# Patient Record
(demographics unavailable — no encounter records)

---

## 2024-10-09 NOTE — ASSESSMENT
Elaine Shoemaker Patient Age: 54 year old  MESSAGE:   Caller:  Patient    Reason for Call: Patient called and states that the [facility] told her that the Lithium 25mg was not made there. Patient wants to know if we can send script to [facility]. Patient is also requesting a 90 day supply.     Patient/Parent Informed that Provider is:  In the Office Today       WEIGHT AND HEIGHT:   Wt Readings from Last 1 Encounters:   05/05/22 130.2 kg (287 lb)     Ht Readings from Last 1 Encounters:   05/05/22 5' 7\" (1.702 m)     BMI Readings from Last 1 Encounters:   05/05/22 44.95 kg/m²       ALLERGIES:  Carbamazepine, Sulfa antibiotics, Amitriptyline, Bupropion, Hydrocodone-acetaminophen, and Ibuprofen  Current Outpatient Medications   Medication   • QUEtiapine (SEROquel) 400 MG tablet   • QUEtiapine (SEROquel) 100 MG tablet   • benztropine (COGENTIN) 0.5 MG tablet   • topiramate (TOPAMAX) 50 MG tablet   • Lithium Carbonate Powder   • metFORMIN (GLUCOPHAGE-XR) 500 MG 24 hr tablet   • chlorproMAZINE (THORAZINE) 25 MG tablet   • LORazepam (Ativan) 0.5 MG tablet   • propRANolol (INDERAL) 20 MG tablet   • atorvastatin (LIPITOR) 40 MG tablet   • lithium 300 MG capsule   • Easy Comfort Lancets Misc   • Sodium Sulfate-Mag Sulfate-KCl 9058-017-828 MG Tab   • Vitamin D, Ergocalciferol, 1.25 mg (50,000 units) capsule   • Accu-Chek Cheryl Plus test strip   • levothyroxine 125 MCG tablet   • polyethylene glycol (MIRALAX) 17 g packet   • pioglitazone (ACTOS) 15 MG tablet   • B Complex Vitamins (B COMPLEX 1 PO)   • vitamin A 45397 units capsule   • aspirin (SHANNAN ASPIRIN EC LOW DOSE) 81 MG EC tablet   • vitamin E 400 UNIT capsule     No current facility-administered medications for this visit.     PHARMACY to use: [facility]          Pharmacy preference(s) on file:   [facility] [place] [address] [id number]  Phone: [phone] Fax: [phone]    [id number] - [place], [place] -  [FreeTextEntry1] : Plan:  #Cough X-Ray Tessalon Pearls  #CABG Healing Well Sutures removed  #RLE Burney Aite Keflex Furosemide 40mg po X 2  [address] [facility] ([address])  [address] [facility] [address]  Phone: [phone] Fax: [phone]      CALL BACK INFO: Ok to leave response (including medical information) on answering machine  ROUTING: Patient's physician/staff        PCP: Meryl Wilson MD         INS: Payor: BLUE CROSS BLUE [place]ELD [place] / Plan: BLUE CHOICE PREFERRED / Product Type: PPO MISC   PATIENT ADDRESS:  [address] [place] [id number]

## 2024-10-09 NOTE — COUNSELING
[Hygeine (Including Daily Shower)] : hygeine (including daily shower) [Importance of Regular Medical Follow-Up] : the importance of regular medical follow-up [No Heavy Lifting] : no heavy lifting (>15-20 lb. for 1 month or 25 lb. for 3 months from date of surgery) [Blood Pressure Control] : blood pressure control [S/S of infection] : signs and symptoms of infection (and to whom it should be reported) [Progressive Ambulation/Activity] : progressive ambulation/activity [Medication/Vitamin/Herb/Food Interaction] : medication/vitamin/herb/food interaction [FreeTextEntry1] : BRAYAN HOUSE is a 60 year M status post CABG. This is a follow up visit with the patient. During the post op visit all cardiac and sternal precautions were reviewed with the patient. Incision care was reviewed. Education regarding nutrition was also reviewed with the patient, to maintain a low salt diet. On arrival patient denies fever, chills nausea, and vomiting. Denies SOB or palpitation. All questions and concerns were addressed. Patient educated on diet and salt restrictions. Medications were reviewed with the patient, and education for cardiac surgery patient must continue aspirin, and statin post-operatively until re-evaluated by patients cardiologist. Patient was instructed to follow up with their cardiologist as appropriate for long term management.

## 2024-10-09 NOTE — REASON FOR VISIT
[Family Member] : family member [de-identified] : s/p CABG, TRACIE Clip [de-identified] : Mr. Hudson is a 59 y/o male that arrives today for a post op visit. Offers no complaints.

## 2024-10-16 NOTE — REASON FOR VISIT
[Family Member] : family member [de-identified] : s/p CABG, TRACIE Clip [de-identified] : Mr. Hudson is a 61 y/o male that arrives today for a post op visit. Offers no complaints.

## 2024-10-16 NOTE — ASSESSMENT
[FreeTextEntry1] : Plan:  #Cough X-Ray Tessalon Pearls continue  #CABG Healing Well Sutures removed RTO 1 weeks for Bp check  #RLE [place] Aite Keflex Continue Furosemide 20mg po qd X 5 days  #HTN Losartan 50 mg

## 2024-10-23 NOTE — REASON FOR VISIT
[Family Member] : family member [de-identified] : s/p CABG, TRACIE Clip [de-identified] : Mr. Hudson is a 61 y/o male that arrives today for a post op visit. Offers no complaints.

## 2024-10-23 NOTE — PHYSICAL EXAM
[] : no respiratory distress [Auscultation Breath Sounds / Voice Sounds] : lungs were clear to auscultation bilaterally [Heart Rate And Rhythm] : heart rate was normal and rhythm regular [Heart Sounds] : normal S1 and S2 [Heart Sounds Gallop] : no gallops [Murmurs] : no murmurs [Heart Sounds Pericardial Friction Rub] : no pericardial rub [Clean] : clean [Dry] : dry [Healing Well] : healing well [No Edema] : no edema Solaraze Counseling:  I discussed with the patient the risks of Solaraze including but not limited to erythema, scaling, itching, weeping, crusting, and pain.

## 2024-10-23 NOTE — ASSESSMENT
[FreeTextEntry1] : Plan:  #Cough X-Ray Tessalon Pearls continue  #CABG Healing Well Sutures removed RTO 2 weeks for Bp check  #HTN Losartan 50 mg BP much better controlled F/U with CTS 2 weeks

## 2024-11-04 NOTE — ASSESSMENT
[FreeTextEntry1] : Mr. BRAYAN HOUSE is a 60 year old man with PMHx of chronic HFmrEF (41%, ICM), multivessel CAD s/p CABG (LIMA-LAD, SVG-OM and RPDA 9/24/24), PFO closure and TRACIE clip, HTN, type 1 IDDM, HLD, and CVA who is presenting for follow up.   -Reviewed TTE (independently), labs -BP elevated, likely due to being off previous anti-hypertensive agents -Stop losartan 50 daily and resume previous entresto  BID -Stop metoprolol tartrate and start metoprolol succinate 50 daily -Will have patient RTC in 2 weeks for BP check. Could switch metoprolol to carvedilol or start spironolactone. -Refer for cardiac rehab due to recent CABG and ICM -Defer [id number] given history of type 1 DM and DKA -Renal artery US negative for renal artery stenosis -Continue atorvastatin 80 daily for HLD for LDL goal <70 -Continue plavix 75 given CVA and CAD. Reduce  to ASA 81 daily. Likely to drop 1 at next visit -Following with EP for ILR interrogation  Time in encounter, including face to face visit and time reviewing chart:  42minutes  Roni Escalona MD, FACC Non-Invasive Cardiology [address], [address] Office: [phone]

## 2024-11-04 NOTE — REASON FOR VISIT
[FreeTextEntry1] : Mr. BRAYAN HOUSE is a 60 year old man with PMHx of chronic HFmrEF (41%, ICM), multivessel CAD s/p CABG (LIMA-LAD, SVG-OM and RPDA 9/24/24), PFO closure and TRACIE clip, HTN, type 1 IDDM, HLD, and CVA who is presenting for follow up. Since last visit, he underwent CABG, PFO closure and TRACIE clip. He has been recovering at home, and generally feels well. He is hoping to get back into exercising. He has no chest pain or dyspnea. He has mild fatigue, but is walking regularly.    TTE 9/27/24 Summary:  1. Left ventricular ejection fraction, by visual estimation, is 40 to 45%.  2. Mildly to moderately decreased global left ventricular systolic function.  3. Apical septal segment, apical inferior segment, apex, and apical anterior segment are abnormal as described above.  4. Spectral Doppler shows impaired relaxation pattern of left ventricular myocardial filling (Grade I diastolic dysfunction).  5. Normal left atrial size.  6. Normal right atrial size.  7. Trace mitral valve regurgitation.  8. Mild tricuspid regurgitation.  9. Sclerotic aortic valve with normal opening. 10. Compared to the TTE on 4/23/24, no significant change noted.  TTE 6/18/24 CONCLUSIONS: 1. Left ventricular cavity is normal in size. Left ventricular wall thickness is normal. Left ventricular systolic function is mildly decreased with an ejection fraction of 41 % by Aragon's method of disks. 2. Entire apex, mid and apical anterior wall, and mid and apical anterior septum are hypokinetic. 3. There is no evidence of a left ventricular thrombus. 4. Normal right ventricular cavity size, with normal wall thickness, and normal systolic function. 5. Compared to the transthoracic echocardiogram performed on 4/24/2024, the LV function appears similar.  TTE 4/24/24 Summary:  1. Technically difficult study. Endocardial visualization was enhanced with intravenous echo contrast.  2. Mildly decreased global left ventricular systolic function.  3. LV Ejection Fraction by Aragon's Method with a biplane EF of 40 %.  4. Normal left ventricular internal cavity size.  5. Entire apex and mid inferoseptal segment are abnormal as described above.  6. Cannot exclude left ventricular apical thrombus.  7. Normal right ventricular size and function.  8. No significant valve disease.  9. This study was ordered as a bubble study. Multiple attempts were made with saline injection however bubbles did not reach the heart. Clinical correlation advised.  VIKTORIA 4/25/24 Summary:  1. Left ventricular ejection fraction, by visual estimation, is 40 to 45%.  2. Mildly decreased global left ventricular systolic function.  3. Normal left atrial size.  4. Normal right atrial size.  5. Trace mitral valve regurgitation.  6. Intravenous injection of agitated saline demonstrates the presence of a small patent foramen ovale.  7. No left atrial appendage thrombus and normal left atrial appendage velocities.  [facility] 8/27/24 Coronary Dominance: RCA LM: No CAD LAD: mLAD , dLAD supplied by left to left collaterals with FRAN 1 flow D1: Mild disease CX: Small vessel with mild disease OM1: 80% stenosis RCA: pRCA 50% stenosis, dRCA 20% disease RPDA: No CAD LVEDP: 6 mmHg EF: 41 % (TTE) ESTIMATED BLOOD LOSS: < 10 mL

## 2024-11-04 NOTE — REASON FOR VISIT
[Family Member] : family member [de-identified] : s/p CABG, TRACIE Clip [de-identified] : Mr. Hudson is a 61 y/o male that arrives today for a post op visit. Offers no complaints.

## 2024-11-04 NOTE — ASSESSMENT
[FreeTextEntry1] : Plan:  #CABG Healing Well Sutures removed   #HTN Losartan 50 mg changed to Entresto per Cardio BP much better controlled  Doing well overall  Due to Off pump CABG DAPT full dose for 6 months F/U with Dr. Escalona for continued management F/U with CTS prn

## 2024-11-07 NOTE — REASON FOR VISIT
[de-identified] : s/p CABG, TRACIE Clip [de-identified] : Mr. Hudson is a 59 y/o male that arrives today for a post op visit. Offers no complaints. [Family Member] : family member

## 2024-11-07 NOTE — PHYSICAL EXAM
[] : no respiratory distress [Auscultation Breath Sounds / Voice Sounds] : lungs were clear to auscultation bilaterally [Heart Rate And Rhythm] : heart rate was normal and rhythm regular [Heart Sounds] : normal S1 and S2 [Heart Sounds Gallop] : no gallops [Murmurs] : no murmurs [Clean] : clean [Heart Sounds Pericardial Friction Rub] : no pericardial rub [Dry] : dry [Healing Well] : healing well [No Edema] : no edema

## 2024-11-07 NOTE — ASSESSMENT
[FreeTextEntry1] : Plan: Patient came in for redness in his eye.  Examined Blown capillary note, No swelling No pain No vision changed Explained if any change to see ophthalmology immediately Occurred after lifting heavy water bottles Reviewed precautions   #CABG Healing Well Sutures removed   #HTN Losartan 50 mg changed to Entresto per Cardio BP much better controlled  Doing well overall  Due to Off pump CABG DAPT full dose for 6 months F/U with Dr. Escalona for continued management F/U with CTS prn

## 2024-11-13 NOTE — CARDIOLOGY SUMMARY
[de-identified] : 5/13/2024:  [de-identified] :  1. Technically difficult study. Endocardial visualization was enhanced  with intravenous echo contrast.  2. Mildly decreased global left ventricular systolic function.  3. LV Ejection Fraction by Aragon's Method with a biplane EF of 40 %.  4. Normal left ventricular internal cavity size.  5. Entire apex and mid inferoseptal segment are abnormal as described  above.  6. Cannot exclude left ventricular apical thrombus.  7. Normal right ventricular size and function.  8. No significant valve disease.  9. This study was ordered as a bubble study. Multiple attempts were made  with saline injection however bubbles did not reach the heart. Clinical  correlation advised.   1. Left ventricular ejection fraction, by visual estimation, is 40 to  45%.  2. Mildly decreased global left ventricular systolic function.  3. Normal left atrial size.  4. Normal right atrial size.  5. Trace mitral valve regurgitation.  6. Intravenous injection of agitated saline demonstrates the presence of  a small patent foramen ovale.  7. No left atrial appendage thrombus and normal left atrial appendage  velocities.

## 2024-11-13 NOTE — HISTORY OF PRESENT ILLNESS
[de-identified] : 61 yo male with hx HTN, DM, HLD who presented with an acute right MCA CVA. He underwent ILR placement for occult AF monitoring.  He presents for follow up. He underwent CABG, TRACIE clip and PFO closure 9/2024.   The patient is feeling well and has no cardiac complaints. Denies CP, palpitations, SOB, dizziness, CONTI, PND, syncope.

## 2024-11-13 NOTE — PROCEDURE
[de-identified] : [employer]onic [de-identified] : LINQ II [de-identified] : [id number] [de-identified] : 4/25/24 [de-identified] : Good [de-identified] : 5 false pause episodes 2 pause episodes on 9/24/2024 coinciding with date of CABG

## 2024-11-13 NOTE — CARDIOLOGY SUMMARY
[de-identified] : 5/13/2024:  [de-identified] :  1. Technically difficult study. Endocardial visualization was enhanced  with intravenous echo contrast.  2. Mildly decreased global left ventricular systolic function.  3. LV Ejection Fraction by Aragon's Method with a biplane EF of 40 %.  4. Normal left ventricular internal cavity size.  5. Entire apex and mid inferoseptal segment are abnormal as described  above.  6. Cannot exclude left ventricular apical thrombus.  7. Normal right ventricular size and function.  8. No significant valve disease.  9. This study was ordered as a bubble study. Multiple attempts were made  with saline injection however bubbles did not reach the heart. Clinical  correlation advised.   1. Left ventricular ejection fraction, by visual estimation, is 40 to  45%.  2. Mildly decreased global left ventricular systolic function.  3. Normal left atrial size.  4. Normal right atrial size.  5. Trace mitral valve regurgitation.  6. Intravenous injection of agitated saline demonstrates the presence of  a small patent foramen ovale.  7. No left atrial appendage thrombus and normal left atrial appendage  velocities.

## 2024-11-13 NOTE — PROCEDURE
[de-identified] : [employer]onic [de-identified] : LINQ II [de-identified] : [id number] [de-identified] : 4/25/24 [de-identified] : Good [de-identified] : 5 false pause episodes 2 pause episodes on 9/24/2024 coinciding with date of CABG

## 2024-11-13 NOTE — HISTORY OF PRESENT ILLNESS
[de-identified] : 59 yo male with hx HTN, DM, HLD who presented with an acute right MCA CVA. He underwent ILR placement for occult AF monitoring.  He presents for follow up. He underwent CABG, TRACIE clip and PFO closure 9/2024.   The patient is feeling well and has no cardiac complaints. Denies CP, palpitations, SOB, dizziness, CONTI, PND, syncope.

## 2024-11-13 NOTE — PHYSICAL EXAM
[General Appearance - Well Developed] : well developed [Normal Appearance] : normal appearance [Well Groomed] : well groomed [General Appearance - Well Nourished] : well nourished [No Deformities] : no deformities [General Appearance - In No Acute Distress] : no acute distress [Heart Rate And Rhythm] : heart rate and rhythm were normal [Heart Sounds] : normal S1 and S2 [Murmurs] : no murmurs present [Arterial Pulses Normal] : the arterial pulses were normal [] : no respiratory distress [Well-Healed] : well-healed [Abdomen Soft] : soft [Nail Clubbing] : no clubbing of the fingernails [Cyanosis, Localized] : no localized cyanosis [FreeTextEntry1] : Parasternal

## 2024-11-13 NOTE — CARDIOLOGY SUMMARY
[de-identified] : 5/13/2024:  [de-identified] :  1. Technically difficult study. Endocardial visualization was enhanced  with intravenous echo contrast.  2. Mildly decreased global left ventricular systolic function.  3. LV Ejection Fraction by Aragon's Method with a biplane EF of 40 %.  4. Normal left ventricular internal cavity size.  5. Entire apex and mid inferoseptal segment are abnormal as described  above.  6. Cannot exclude left ventricular apical thrombus.  7. Normal right ventricular size and function.  8. No significant valve disease.  9. This study was ordered as a bubble study. Multiple attempts were made  with saline injection however bubbles did not reach the heart. Clinical  correlation advised.   1. Left ventricular ejection fraction, by visual estimation, is 40 to  45%.  2. Mildly decreased global left ventricular systolic function.  3. Normal left atrial size.  4. Normal right atrial size.  5. Trace mitral valve regurgitation.  6. Intravenous injection of agitated saline demonstrates the presence of  a small patent foramen ovale.  7. No left atrial appendage thrombus and normal left atrial appendage  velocities.

## 2024-11-13 NOTE — HISTORY OF PRESENT ILLNESS
[de-identified] : 61 yo male with hx HTN, DM, HLD who presented with an acute right MCA CVA. He underwent ILR placement for occult AF monitoring.  He presents for follow up. He underwent CABG, TRACIE clip and PFO closure 9/2024.   The patient is feeling well and has no cardiac complaints. Denies CP, palpitations, SOB, dizziness, CONTI, PND, syncope.

## 2024-11-13 NOTE — ASSESSMENT
[FreeTextEntry1] : CVA s/p ILR  - Wound is well healed. There are no signs or symptoms of infection of inflammation. There is no redness, no swelling, no erythema. The patient has no pain.  - Device interrogation normal. I interrogated and reprogrammed the device as described above.  - No significant events.  - The patient is on remote and transmitting.   CVA  - Follow up neuro as scheduled. No deficits.   Cardiomyopathy, PFO s/p CABG, TRACIE clip and PFO closure on 9/24/2024  - Doing well.  - Increase physical activity as tolerated. - 2g Na HH diet.  HTN - BP elevated, manual recheck improved but still elevated.  - BP log at home. - F/U with Dr. Escalona.   RTO in 9-12 months.

## 2024-11-13 NOTE — PROCEDURE
[de-identified] : [employer] [de-identified] : LINQ II [de-identified] : [id number] [de-identified] : 4/25/24 [de-identified] : Good [de-identified] : 5 false pause episodes 2 pause episodes on 9/24/2024 coinciding with date of CABG

## 2024-12-04 NOTE — ASSESSMENT
[FreeTextEntry1] : Mr. BRAYAN HOUSE is a 60 year old man with PMHx of chronic HFmrEF (41%, ICM), multivessel CAD s/p CABG (LIMA-LAD, SVG-OM and RPDA 9/24/24), PFO closure and TRACIE clip, HTN, type 1 IDDM, HLD, and CVA who is presenting for follow up.   -Reviewed TTE (independently), labs -BP elevated, likely due to being off previous anti-hypertensive agents -Continue entresto  BID -Continue coreg 25 BID -Add spironolactone due to elevated BP and LE swelling  -Refer for cardiac rehab due to recent CABG and ICM -Defer [id number] given history of type 1 DM and DKA -Renal artery US negative for renal artery stenosis -Continue atorvastatin 80 daily for HLD for LDL goal <70 -Continue plavix 75 given CVA and CAD. Reduce  to ASA 81 daily. Likely to drop 1 at next visit -Following with EP for ILR interrogation  Time in encounter, including face to face visit and time reviewing chart:  42minutes  Roni Escalona MD, FACC Non-Invasive Cardiology [address] Office: [phone]

## 2024-12-04 NOTE — REASON FOR VISIT
[FreeTextEntry1] : Mr. BRAYAN HOUSE is a 60 year old man with PMHx of chronic HFmrEF (41%, ICM), multivessel CAD s/p CABG (LIMA-LAD, SVG-OM and RPDA 9/24/24), PFO closure and TRACIE clip, HTN, type 1 IDDM, HLD, and CVA who is presenting for follow up. Since last visit, he continues to recover at home, and generally feels well. He is hoping to get back into exercising. He has no chest pain or dyspnea. He has mild fatigue, but is walking regularly.    TTE 9/27/24 Summary:  1. Left ventricular ejection fraction, by visual estimation, is 40 to 45%.  2. Mildly to moderately decreased global left ventricular systolic function.  3. Apical septal segment, apical inferior segment, apex, and apical anterior segment are abnormal as described above.  4. Spectral Doppler shows impaired relaxation pattern of left ventricular myocardial filling (Grade I diastolic dysfunction).  5. Normal left atrial size.  6. Normal right atrial size.  7. Trace mitral valve regurgitation.  8. Mild tricuspid regurgitation.  9. Sclerotic aortic valve with normal opening. 10. Compared to the TTE on 4/23/24, no significant change noted.  TTE 6/18/24 CONCLUSIONS: 1. Left ventricular cavity is normal in size. Left ventricular wall thickness is normal. Left ventricular systolic function is mildly decreased with an ejection fraction of 41 % by Aragon's method of disks. 2. Entire apex, mid and apical anterior wall, and mid and apical anterior septum are hypokinetic. 3. There is no evidence of a left ventricular thrombus. 4. Normal right ventricular cavity size, with normal wall thickness, and normal systolic function. 5. Compared to the transthoracic echocardiogram performed on 4/24/2024, the LV function appears similar.  TTE 4/24/24 Summary:  1. Technically difficult study. Endocardial visualization was enhanced with intravenous echo contrast.  2. Mildly decreased global left ventricular systolic function.  3. LV Ejection Fraction by Aragon's Method with a biplane EF of 40 %.  4. Normal left ventricular internal cavity size.  5. Entire apex and mid inferoseptal segment are abnormal as described above.  6. Cannot exclude left ventricular apical thrombus.  7. Normal right ventricular size and function.  8. No significant valve disease.  9. This study was ordered as a bubble study. Multiple attempts were made with saline injection however bubbles did not reach the heart. Clinical correlation advised.  VIKTORIA 4/25/24 Summary:  1. Left ventricular ejection fraction, by visual estimation, is 40 to 45%.  2. Mildly decreased global left ventricular systolic function.  3. Normal left atrial size.  4. Normal right atrial size.  5. Trace mitral valve regurgitation.  6. Intravenous injection of agitated saline demonstrates the presence of a small patent foramen ovale.  7. No left atrial appendage thrombus and normal left atrial appendage velocities.  [facility] 8/27/24 Coronary Dominance: RCA LM: No CAD LAD: mLAD , dLAD supplied by left to left collaterals with FRAN 1 flow D1: Mild disease CX: Small vessel with mild disease OM1: 80% stenosis RCA: pRCA 50% stenosis, dRCA 20% disease RPDA: No CAD LVEDP: 6 mmHg EF: 41 % (TTE) ESTIMATED BLOOD LOSS: < 10 mL

## 2024-12-04 NOTE — ASSESSMENT
[FreeTextEntry1] : Mr. BRAYAN HOUSE is a 60 year old man with PMHx of chronic HFmrEF (41%, ICM), multivessel CAD s/p CABG (LIMA-LAD, SVG-OM and RPDA 9/24/24), PFO closure and TRACIE clip, HTN, type 1 IDDM, HLD, and CVA who is presenting for follow up.   -Reviewed TTE (independently), labs -BP elevated, likely due to being off previous anti-hypertensive agents -Continue entresto  BID -Continue coreg 25 BID -Add spironolactone due to elevated BP and LE swelling  -Refer for cardiac rehab due to recent CABG and ICM -Defer [id number] given history of type 1 DM and DKA -Renal artery US negative for renal artery stenosis -Continue atorvastatin 80 daily for HLD for LDL goal <70 -Continue plavix 75 given CVA and CAD. Reduce  to ASA 81 daily. Likely to drop 1 at next visit -Following with EP for ILR interrogation  Time in encounter, including face to face visit and time reviewing chart:  42minutes  Roni Escalona MD, FACC Non-Invasive Cardiology [address] Office: [phone]

## 2025-02-21 NOTE — REASON FOR VISIT
[Coronary Artery Disease] : coronary artery disease [FreeTextEntry1] : Mr. BRAYAN HOUSE is a 61 year old man with PMHx of chronic HFmrEF (41%, ICM), multivessel CAD s/p CABG (LIMA-LAD, SVG-OM and RPDA 9/24/24), PFO closure and TRACIE clip, HTN, type 1 IDDM, HLD, and CVA who is presenting for follow up. He is undergoing cardiac rehab, and enjoying it very much. His exercise ability and endurance are improving. He has no chest pain or dyspnea.   In April 2024, he presented with stroke symptoms and was found to have acute ischemic stroke of the R MCA. Neurology felt the stroke was embolic. He underwent TTE, which demonstrated a moderately reduce LV function 40%. VIKTORIA was significant for a small PFO. ILR implanted. He underwent LHC, which revealed multivessel CAD. He then underwent CABG, PFO closure, and TRACIE clip with Dr Lowry 9/2024.  TTE 9/27/24 Summary:  1. Left ventricular ejection fraction, by visual estimation, is 40 to 45%.  2. Mildly to moderately decreased global left ventricular systolic function.  3. Apical septal segment, apical inferior segment, apex, and apical anterior segment are abnormal as described above.  4. Spectral Doppler shows impaired relaxation pattern of left ventricular myocardial filling (Grade I diastolic dysfunction).  5. Normal left atrial size.  6. Normal right atrial size.  7. Trace mitral valve regurgitation.  8. Mild tricuspid regurgitation.  9. Sclerotic aortic valve with normal opening. 10. Compared to the TTE on 4/23/24, no significant change noted.  TTE 6/18/24 CONCLUSIONS: 1. Left ventricular cavity is normal in size. Left ventricular wall thickness is normal. Left ventricular systolic function is mildly decreased with an ejection fraction of 41 % by Aragon's method of disks. 2. Entire apex, mid and apical anterior wall, and mid and apical anterior septum are hypokinetic. 3. There is no evidence of a left ventricular thrombus. 4. Normal right ventricular cavity size, with normal wall thickness, and normal systolic function. 5. Compared to the transthoracic echocardiogram performed on 4/24/2024, the LV function appears similar.  TTE 4/24/24 Summary:  1. Technically difficult study. Endocardial visualization was enhanced with intravenous echo contrast.  2. Mildly decreased global left ventricular systolic function.  3. LV Ejection Fraction by Aragon's Method with a biplane EF of 40 %.  4. Normal left ventricular internal cavity size.  5. Entire apex and mid inferoseptal segment are abnormal as described above.  6. Cannot exclude left ventricular apical thrombus.  7. Normal right ventricular size and function.  8. No significant valve disease.  9. This study was ordered as a bubble study. Multiple attempts were made with saline injection however bubbles did not reach the heart. Clinical correlation advised.  VIKTORIA 4/25/24 Summary:  1. Left ventricular ejection fraction, by visual estimation, is 40 to 45%.  2. Mildly decreased global left ventricular systolic function.  3. Normal left atrial size.  4. Normal right atrial size.  5. Trace mitral valve regurgitation.  6. Intravenous injection of agitated saline demonstrates the presence of a small patent foramen ovale.  7. No left atrial appendage thrombus and normal left atrial appendage velocities.  [facility] 8/27/24 Coronary Dominance: RCA LM: No CAD LAD: mLAD , dLAD supplied by left to left collaterals with FRAN 1 flow D1: Mild disease CX: Small vessel with mild disease OM1: 80% stenosis RCA: pRCA 50% stenosis, dRCA 20% disease RPDA: No CAD LVEDP: 6 mmHg EF: 41 % (TTE) ESTIMATED BLOOD LOSS: < 10 mL

## 2025-02-21 NOTE — ASSESSMENT
[FreeTextEntry1] : Mr. BRAYAN HOUSE is a 61 year old man with PMHx of chronic HFmrEF (41%, ICM), multivessel CAD s/p CABG (LIMA-LAD, SVG-OM and RPDA 9/24/24), PFO closure and TRACIE clip, HTN, type 1 IDDM, HLD, and CVA who is presenting for follow up.   -Reviewed TTE (independently), labs -Continue entresto  BID -Continue coreg 25 BID -Continue spironolactone 25 daily for HTN -Continue amlodipine 5 daily for additional anti-hypertensive treatment -Continue atorvastatin 80 daily for HLD for LDL goal <70 -Continue plavix 75 given CVA and CAD.  -Reduce  to ASA 81 daily -Defer [id number] given history of type 1 DM and DKA -Renal artery US negative for renal artery stenosis -Going to cardiac rehab due to recent CABG and ICM -Following with EP for ILR interrogation -Order labs  -Encouraged improved lifestyle modifications with these recommendations below: -Plant-based and Mediterranean diets, along with increased fruit, nut, vegetable, legume, and lean vegetable or animal protein (preferably fish) consumption -Engage in at least 150 minutes per week of accumulated moderate-intensity aerobic physical activity or 75 minutes per week of vigorous-intensity aerobic physical activity  Time in encounter, including face to face visit and time reviewing chart:  35 minutes  Roni Escalona MD, FACC Non-Invasive Cardiology [address], [address] Office: [phone]

## 2025-02-21 NOTE — ASSESSMENT
[FreeTextEntry1] : Mr. BRAYAN HOUSE is a 61 year old man with PMHx of chronic HFmrEF (41%, ICM), multivessel CAD s/p CABG (LIMA-LAD, SVG-OM and RPDA 9/24/24), PFO closure and TRACIE clip, HTN, type 1 IDDM, HLD, and CVA who is presenting for follow up.   -Reviewed TTE (independently), labs -Continue entresto  BID -Continue coreg 25 BID -Continue spironolactone 25 daily for HTN -Continue amlodipine 5 daily for additional anti-hypertensive treatment -Continue atorvastatin 80 daily for HLD for LDL goal <70 -Continue plavix 75 given CVA and CAD.  -Reduce  to ASA 81 daily -Defer [id number] given history of type 1 DM and DKA -Renal artery US negative for renal artery stenosis -Going to cardiac rehab due to recent CABG and ICM -Following with EP for ILR interrogation -Order labs  -Encouraged improved lifestyle modifications with these recommendations below: -Plant-based and Mediterranean diets, along with increased fruit, nut, vegetable, legume, and lean vegetable or animal protein (preferably fish) consumption -Engage in at least 150 minutes per week of accumulated moderate-intensity aerobic physical activity or 75 minutes per week of vigorous-intensity aerobic physical activity  Time in encounter, including face to face visit and time reviewing chart:  35 minutes  Roni Escalona MD, FACC Non-Invasive Cardiology [address], [address] Office: [phone]

## 2025-02-21 NOTE — REASON FOR VISIT
[Coronary Artery Disease] : coronary artery disease [FreeTextEntry1] : Mr. BRAYAN HOUSE is a 61 year old man with PMHx of chronic HFmrEF (41%, ICM), multivessel CAD s/p CABG (LIMA-LAD, SVG-OM and RPDA 9/24/24), PFO closure and TRACIE clip, HTN, type 1 IDDM, HLD, and CVA who is presenting for follow up. He is undergoing cardiac rehab, and enjoying it very much. His exercise ability and endurance are improving. He has no chest pain or dyspnea.   In April 2024, he presented with stroke symptoms and was found to have acute ischemic stroke of the R MCA. Neurology felt the stroke was embolic. He underwent TTE, which demonstrated a moderately reduce LV function 40%. VIKTORIA was significant for a small PFO. ILR implanted. He underwent LHC, which revealed multivessel CAD. He then underwent CABG, PFO closure, and TRACIE clip with Dr Lowry 9/2024.  TTE 9/27/24 Summary:  1. Left ventricular ejection fraction, by visual estimation, is 40 to 45%.  2. Mildly to moderately decreased global left ventricular systolic function.  3. Apical septal segment, apical inferior segment, apex, and apical anterior segment are abnormal as described above.  4. Spectral Doppler shows impaired relaxation pattern of left ventricular myocardial filling (Grade I diastolic dysfunction).  5. Normal left atrial size.  6. Normal right atrial size.  7. Trace mitral valve regurgitation.  8. Mild tricuspid regurgitation.  9. Sclerotic aortic valve with normal opening. 10. Compared to the TTE on 4/23/24, no significant change noted.  TTE 6/18/24 CONCLUSIONS: 1. Left ventricular cavity is normal in size. Left ventricular wall thickness is normal. Left ventricular systolic function is mildly decreased with an ejection fraction of 41 % by Aragon's method of disks. 2. Entire apex, mid and apical anterior wall, and mid and apical anterior septum are hypokinetic. 3. There is no evidence of a left ventricular thrombus. 4. Normal right ventricular cavity size, with normal wall thickness, and normal systolic function. 5. Compared to the transthoracic echocardiogram performed on 4/24/2024, the LV function appears similar.  TTE 4/24/24 Summary:  1. Technically difficult study. Endocardial visualization was enhanced with intravenous echo contrast.  2. Mildly decreased global left ventricular systolic function.  3. LV Ejection Fraction by Aragon's Method with a biplane EF of 40 %.  4. Normal left ventricular internal cavity size.  5. Entire apex and mid inferoseptal segment are abnormal as described above.  6. Cannot exclude left ventricular apical thrombus.  7. Normal right ventricular size and function.  8. No significant valve disease.  9. This study was ordered as a bubble study. Multiple attempts were made with saline injection however bubbles did not reach the heart. Clinical correlation advised.  VIKTORIA 4/25/24 Summary:  1. Left ventricular ejection fraction, by visual estimation, is 40 to 45%.  2. Mildly decreased global left ventricular systolic function.  3. Normal left atrial size.  4. Normal right atrial size.  5. Trace mitral valve regurgitation.  6. Intravenous injection of agitated saline demonstrates the presence of a small patent foramen ovale.  7. No left atrial appendage thrombus and normal left atrial appendage velocities.  [facility] 8/27/24 Coronary Dominance: RCA LM: No CAD LAD: mLAD , dLAD supplied by left to left collaterals with FRAN 1 flow D1: Mild disease CX: Small vessel with mild disease OM1: 80% stenosis RCA: pRCA 50% stenosis, dRCA 20% disease RPDA: No CAD LVEDP: 6 mmHg EF: 41 % (TTE) ESTIMATED BLOOD LOSS: < 10 mL

## 2025-06-26 NOTE — REASON FOR VISIT
[Coronary Artery Disease] : coronary artery disease [FreeTextEntry1] : Mr. BRAYAN HOUSE is a 61 year old man with PMHx of chronic HFmrEF (41%, ICM), multivessel CAD s/p CABG (LIMA-LAD, SVG-OM and RPDA 9/24/24), PFO closure and TRACIE clip, HTN, type 1 IDDM, HLD, and CVA who is presenting for follow up.   In April 2024, he presented with stroke symptoms and was found to have acute ischemic stroke of the R MCA. Neurology felt the stroke was embolic. He underwent TTE, which demonstrated a moderately reduce LV function 40%. VIKTORIA was significant for a small PFO. ILR implanted. He underwent LHC, which revealed multivessel CAD. He then underwent CABG, PFO closure, and TRACIE clip with Dr Lowry 9/2024.  He completed cardiac rehab. His exercise ability and endurance are improving. He has no chest pain or dyspnea.   TTE 9/27/24 Summary:  1. Left ventricular ejection fraction, by visual estimation, is 40 to 45%.  2. Mildly to moderately decreased global left ventricular systolic function.  3. Apical septal segment, apical inferior segment, apex, and apical anterior segment are abnormal as described above.  4. Spectral Doppler shows impaired relaxation pattern of left ventricular myocardial filling (Grade I diastolic dysfunction).  5. Normal left atrial size.  6. Normal right atrial size.  7. Trace mitral valve regurgitation.  8. Mild tricuspid regurgitation.  9. Sclerotic aortic valve with normal opening. 10. Compared to the TTE on 4/23/24, no significant change noted.  TTE 6/18/24 CONCLUSIONS: 1. Left ventricular cavity is normal in size. Left ventricular wall thickness is normal. Left ventricular systolic function is mildly decreased with an ejection fraction of 41 % by Aragon's method of disks. 2. Entire apex, mid and apical anterior wall, and mid and apical anterior septum are hypokinetic. 3. There is no evidence of a left ventricular thrombus. 4. Normal right ventricular cavity size, with normal wall thickness, and normal systolic function. 5. Compared to the transthoracic echocardiogram performed on 4/24/2024, the LV function appears similar.  TTE 4/24/24 Summary:  1. Technically difficult study. Endocardial visualization was enhanced with intravenous echo contrast.  2. Mildly decreased global left ventricular systolic function.  3. LV Ejection Fraction by Aragon's Method with a biplane EF of 40 %.  4. Normal left ventricular internal cavity size.  5. Entire apex and mid inferoseptal segment are abnormal as described above.  6. Cannot exclude left ventricular apical thrombus.  7. Normal right ventricular size and function.  8. No significant valve disease.  9. This study was ordered as a bubble study. Multiple attempts were made with saline injection however bubbles did not reach the heart. Clinical correlation advised.  VIKTORIA 4/25/24 Summary:  1. Left ventricular ejection fraction, by visual estimation, is 40 to 45%.  2. Mildly decreased global left ventricular systolic function.  3. Normal left atrial size.  4. Normal right atrial size.  5. Trace mitral valve regurgitation.  6. Intravenous injection of agitated saline demonstrates the presence of a small patent foramen ovale.  7. No left atrial appendage thrombus and normal left atrial appendage velocities.  [facility] 8/27/24 Coronary Dominance: RCA LM: No CAD LAD: mLAD , dLAD supplied by left to left collaterals with FRAN 1 flow D1: Mild disease CX: Small vessel with mild disease OM1: 80% stenosis RCA: pRCA 50% stenosis, dRCA 20% disease RPDA: No CAD LVEDP: 6 mmHg EF: 41 % (TTE) ESTIMATED BLOOD LOSS: < 10 mL

## 2025-06-26 NOTE — ASSESSMENT
[FreeTextEntry1] : Mr. BRAYAN HOUSE is a 61 year old man with PMHx of chronic HFmrEF (41%, ICM), multivessel CAD s/p CABG (LIMA-LAD, SVG-OM and RPDA 9/24/24), PFO closure and TRACIE clip, HTN, type 1 IDDM, HLD, and CVA who is presenting for follow up.   -Reviewed TTE (independently), labs -Continue entresto  BID -Continue coreg 25 BID -Continue spironolactone 25 daily for HTN -Continue amlodipine 5 daily for additional anti-hypertensive treatment -Continue atorvastatin 80 daily for HLD for LDL goal <70 -Continue plavix 75 given CVA and CAD. Stop at next visit.  -Continue ASA 81 daily -Defer [id number] given history of type 1 DM and DKA -Renal artery US negative for renal artery stenosis -Completed cardiac rehab due to recent CABG and ICM -Following with EP for ILR interrogation -Order labs  -Encouraged improved lifestyle modifications with these recommendations below: -Plant-based and Mediterranean diets, along with increased fruit, nut, vegetable, legume, and lean vegetable or animal protein (preferably fish) consumption -Engage in at least 150 minutes per week of accumulated moderate-intensity aerobic physical activity or 75 minutes per week of vigorous-intensity aerobic physical activity  Time in encounter, including face to face visit and time reviewing chart:  35 minutes  Roni Escalona MD, FACC Non-Invasive Cardiology [address], [address] Office: [phone]

## 2025-06-26 NOTE — REASON FOR VISIT
[Coronary Artery Disease] : coronary artery disease [FreeTextEntry1] : Mr. BRAYAN HOUSE is a 61 year old man with PMHx of chronic HFmrEF (41%, ICM), multivessel CAD s/p CABG (LIMA-LAD, SVG-OM and RPDA 9/24/24), PFO closure and TRACIE clip, HTN, type 1 IDDM, HLD, and CVA who is presenting for follow up.   In April 2024, he presented with stroke symptoms and was found to have acute ischemic stroke of the R MCA. Neurology felt the stroke was embolic. He underwent TTE, which demonstrated a moderately reduce LV function 40%. VIKTORIA was significant for a small PFO. ILR implanted. He underwent LHC, which revealed multivessel CAD. He then underwent CABG, PFO closure, and TRACIE clip with Dr Lowry 9/2024.  He completed cardiac rehab. His exercise ability and endurance are improving. He has no chest pain or dyspnea.   TTE 9/27/24 Summary:  1. Left ventricular ejection fraction, by visual estimation, is 40 to 45%.  2. Mildly to moderately decreased global left ventricular systolic function.  3. Apical septal segment, apical inferior segment, apex, and apical anterior segment are abnormal as described above.  4. Spectral Doppler shows impaired relaxation pattern of left ventricular myocardial filling (Grade I diastolic dysfunction).  5. Normal left atrial size.  6. Normal right atrial size.  7. Trace mitral valve regurgitation.  8. Mild tricuspid regurgitation.  9. Sclerotic aortic valve with normal opening. 10. Compared to the TTE on 4/23/24, no significant change noted.  TTE 6/18/24 CONCLUSIONS: 1. Left ventricular cavity is normal in size. Left ventricular wall thickness is normal. Left ventricular systolic function is mildly decreased with an ejection fraction of 41 % by Aragon's method of disks. 2. Entire apex, mid and apical anterior wall, and mid and apical anterior septum are hypokinetic. 3. There is no evidence of a left ventricular thrombus. 4. Normal right ventricular cavity size, with normal wall thickness, and normal systolic function. 5. Compared to the transthoracic echocardiogram performed on 4/24/2024, the LV function appears similar.  TTE 4/24/24 Summary:  1. Technically difficult study. Endocardial visualization was enhanced with intravenous echo contrast.  2. Mildly decreased global left ventricular systolic function.  3. LV Ejection Fraction by Aragon's Method with a biplane EF of 40 %.  4. Normal left ventricular internal cavity size.  5. Entire apex and mid inferoseptal segment are abnormal as described above.  6. Cannot exclude left ventricular apical thrombus.  7. Normal right ventricular size and function.  8. No significant valve disease.  9. This study was ordered as a bubble study. Multiple attempts were made with saline injection however bubbles did not reach the heart. Clinical correlation advised.  VIKTORIA 4/25/24 Summary:  1. Left ventricular ejection fraction, by visual estimation, is 40 to 45%.  2. Mildly decreased global left ventricular systolic function.  3. Normal left atrial size.  4. Normal right atrial size.  5. Trace mitral valve regurgitation.  6. Intravenous injection of agitated saline demonstrates the presence of a small patent foramen ovale.  7. No left atrial appendage thrombus and normal left atrial appendage velocities.  [facility] 8/27/24 Coronary Dominance: RCA LM: No CAD LAD: mLAD , dLAD supplied by left to left collaterals with FRAN 1 flow D1: Mild disease CX: Small vessel with mild disease OM1: 80% stenosis RCA: pRCA 50% stenosis, dRCA 20% disease RPDA: No CAD LVEDP: 6 mmHg EF: 41 % (TTE) ESTIMATED BLOOD LOSS: < 10 mL

## 2025-06-26 NOTE — ASSESSMENT
[FreeTextEntry1] : Mr. BRAYAN HOUSE is a 61 year old man with PMHx of chronic HFmrEF (41%, ICM), multivessel CAD s/p CABG (LIMA-LAD, SVG-OM and RPDA 9/24/24), PFO closure and TRACIE clip, HTN, type 1 IDDM, HLD, and CVA who is presenting for follow up.   -Reviewed TTE (independently), labs -Continue entresto  BID -Continue coreg 25 BID -Continue spironolactone 25 daily for HTN -Continue amlodipine 5 daily for additional anti-hypertensive treatment -Continue atorvastatin 80 daily for HLD for LDL goal <70 -Continue plavix 75 given CVA and CAD. Stop at next visit.  -Continue ASA 81 daily -Defer [id number] given history of type 1 DM and DKA -Renal artery US negative for renal artery stenosis -Completed cardiac rehab due to recent CABG and ICM -Following with EP for ILR interrogation -Order labs  -Encouraged improved lifestyle modifications with these recommendations below: -Plant-based and Mediterranean diets, along with increased fruit, nut, vegetable, legume, and lean vegetable or animal protein (preferably fish) consumption -Engage in at least 150 minutes per week of accumulated moderate-intensity aerobic physical activity or 75 minutes per week of vigorous-intensity aerobic physical activity  Time in encounter, including face to face visit and time reviewing chart:  35 minutes  Roni Escalona MD, FACC Non-Invasive Cardiology [address], [address] Office: [phone]